# Patient Record
Sex: FEMALE | Race: ASIAN
[De-identification: names, ages, dates, MRNs, and addresses within clinical notes are randomized per-mention and may not be internally consistent; named-entity substitution may affect disease eponyms.]

---

## 2020-02-26 ENCOUNTER — HOSPITAL ENCOUNTER (EMERGENCY)
Dept: HOSPITAL 72 - EMR | Age: 80
Discharge: HOME | End: 2020-02-26
Payer: MEDICARE

## 2020-02-26 VITALS — SYSTOLIC BLOOD PRESSURE: 124 MMHG | DIASTOLIC BLOOD PRESSURE: 72 MMHG

## 2020-02-26 VITALS — HEIGHT: 63 IN | BODY MASS INDEX: 26.58 KG/M2 | WEIGHT: 150 LBS

## 2020-02-26 VITALS — DIASTOLIC BLOOD PRESSURE: 71 MMHG | SYSTOLIC BLOOD PRESSURE: 120 MMHG

## 2020-02-26 VITALS — SYSTOLIC BLOOD PRESSURE: 120 MMHG | DIASTOLIC BLOOD PRESSURE: 71 MMHG

## 2020-02-26 DIAGNOSIS — J98.8: Primary | ICD-10-CM

## 2020-02-26 LAB
ADD MANUAL DIFF: NO
ALBUMIN SERPL-MCNC: 3.8 G/DL (ref 3.4–5)
ALBUMIN/GLOB SERPL: 1.1 {RATIO} (ref 1–2.7)
ALP SERPL-CCNC: 58 U/L (ref 46–116)
ALT SERPL-CCNC: 30 U/L (ref 12–78)
ANION GAP SERPL CALC-SCNC: 11 MMOL/L (ref 5–15)
AST SERPL-CCNC: 24 U/L (ref 15–37)
BASOPHILS NFR BLD AUTO: 1.9 % (ref 0–2)
BILIRUB SERPL-MCNC: 0.8 MG/DL (ref 0.2–1)
BUN SERPL-MCNC: 23 MG/DL (ref 7–18)
CALCIUM SERPL-MCNC: 8.8 MG/DL (ref 8.5–10.1)
CHLORIDE SERPL-SCNC: 110 MMOL/L (ref 98–107)
CO2 SERPL-SCNC: 26 MMOL/L (ref 21–32)
CREAT SERPL-MCNC: 0.8 MG/DL (ref 0.55–1.3)
EOSINOPHIL NFR BLD AUTO: 2.6 % (ref 0–3)
ERYTHROCYTE [DISTWIDTH] IN BLOOD BY AUTOMATED COUNT: 9.5 % (ref 11.6–14.8)
GLOBULIN SER-MCNC: 3.5 G/DL
HCT VFR BLD CALC: 36.1 % (ref 37–47)
HGB BLD-MCNC: 13.5 G/DL (ref 12–16)
LYMPHOCYTES NFR BLD AUTO: 38.1 % (ref 20–45)
MCV RBC AUTO: 90 FL (ref 80–99)
MONOCYTES NFR BLD AUTO: 9.5 % (ref 1–10)
NEUTROPHILS NFR BLD AUTO: 47.9 % (ref 45–75)
PLATELET # BLD: 229 K/UL (ref 150–450)
POTASSIUM SERPL-SCNC: 4.3 MMOL/L (ref 3.5–5.1)
RBC # BLD AUTO: 4 M/UL (ref 4.2–5.4)
SODIUM SERPL-SCNC: 147 MMOL/L (ref 136–145)
WBC # BLD AUTO: 5.6 K/UL (ref 4.8–10.8)

## 2020-02-26 PROCEDURE — 71045 X-RAY EXAM CHEST 1 VIEW: CPT

## 2020-02-26 PROCEDURE — 80053 COMPREHEN METABOLIC PANEL: CPT

## 2020-02-26 PROCEDURE — 85025 COMPLETE CBC W/AUTO DIFF WBC: CPT

## 2020-02-26 PROCEDURE — 36415 COLL VENOUS BLD VENIPUNCTURE: CPT

## 2020-02-26 PROCEDURE — 86710 INFLUENZA VIRUS ANTIBODY: CPT

## 2020-02-26 PROCEDURE — 99283 EMERGENCY DEPT VISIT LOW MDM: CPT

## 2020-02-26 NOTE — NUR
ED Nurse Note:

received a call from [facility], Per Ibeth Reynolds, she is clearing pt. from any 
suspiscion of corona virus

## 2020-02-26 NOTE — EMERGENCY ROOM REPORT
History of Present Illness


General


Chief Complaint:  Upper Respiratory Illness


Source:  Patient





Present Illness


HPI


Disclaimer: Please note that this report is being documented using DRAGON 

technology. This can lead to erroneous entry secondary to incorrect 

interpretation by the dictating instrument.





HPI: 79-year-old female presents for evaluation of cough.  Symptoms began 

yesterday.  Son notes a productive cough over the evening with 2 episodes of 

nonbloody nonbilious emesis.  Subjective fevers noted but no objective 

temperature readings.  Reports chills.  Denies chest pain, abdominal pain, 

nausea at this time.  Denies diarrhea or rash.  No recent travel.  Arrives 

afebrile.  Patient arrives by ambulance.  Family brought her in because they 

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 

worried about exposure to the new coronavirus.  The patient has had no travel.  

No known contact with anyone traveling from [place].


 


PMH: None reported


 


PSH: None reported


 


Allergies: None reported


 


Social Hx: None reported


Allergies:  


Coded Allergies:  


     No Known Allergies (Unverified , 2/26/20)





Nursing Documentation-PMH


Past Medical History:  No History, Except For





Review of Systems


All Other Systems:  negative except mentioned in HPI





Physical Exam





Vital Signs








  Date Time  Temp Pulse Resp B/P (MAP) Pulse Ox O2 Delivery O2 Flow Rate FiO2


 


2/26/20 07:52 98.4 60 15 124/72 (89) 99 Room Air  





 





General: Awake and alert, no acute distress


HEENT: NC/AT. EOMI. 


Cardiovascular: RRR.  S1 and S2 normal.  No murmur appreciated


Resp: Normal work of breathing. No cough, wheezing or crackles appreciated


Abdomen: Abdomen is soft, nondistended.  Nontender


Skin: Intact.  No abrasions, laceration or rash over the exposed skin


MSK: Normal tone and bulk. Moving all extremities.  No obvious deformity.


Neuro: Awake and alert.  Mentating appropriately.





Medical Decision Making


Diagnostic Impression:  


 Primary Impression:  


 Respiratory infection


ER Course


79-year-old female presents for evaluation of cough and emesis.  Describing 

body wide aches and pains as well.  Overall appears to be a viral syndrome, 

possibly influenza though pneumonia, bronchitis, gastritis, gastroenteritis 

also on the differential.  Family is concerned over possible exposure to novel 

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 

that may have been traveling from [place].  The patient herself has no travel 

history and arrives afebrile.





Laboratory Tests








Test


  2/26/20


08:09


 


White Blood Count


  5.6 K/UL


(4.8-10.8)


 


Red Blood Count


  4.00 M/UL


(4.20-5.40)  L


 


Hemoglobin


  13.5 G/DL


(12.0-16.0)


 


Hematocrit


  36.1 %


(37.0-47.0)  L


 


Mean Corpuscular Volume 90 FL (80-99)  


 


Mean Corpuscular Hemoglobin


  33.6 PG


(27.0-31.0)  H


 


Mean Corpuscular Hemoglobin


Concent 37.3 G/DL


(32.0-36.0)  H


 


Red Cell Distribution Width


  9.5 %


(11.6-14.8)  L


 


Platelet Count


  229 K/UL


(150-450)


 


Mean Platelet Volume


  6.2 FL


(6.5-10.1)  L


 


Neutrophils (%) (Auto)


  47.9 %


(45.0-75.0)


 


Lymphocytes (%) (Auto)


  38.1 %


(20.0-45.0)


 


Monocytes (%) (Auto)


  9.5 %


(1.0-10.0)


 


Eosinophils (%) (Auto)


  2.6 %


(0.0-3.0)


 


Basophils (%) (Auto)


  1.9 %


(0.0-2.0)


 


Sodium Level


  147 MMOL/L


(136-145)  H


 


Potassium Level


  4.3 MMOL/L


(3.5-5.1)


 


Chloride Level


  110 MMOL/L


()  H


 


Carbon Dioxide Level


  26 MMOL/L


(21-32)


 


Anion Gap


  11 mmol/L


(5-15)


 


Blood Urea Nitrogen


  23 mg/dL


(7-18)  H


 


Creatinine


  0.8 MG/DL


(0.55-1.30)


 


Estimate Glomerular


Filtration Rate > 60 mL/min


(>60)


 


Glucose Level


  100 MG/DL


()


 


Calcium Level


  8.8 MG/DL


(8.5-10.1)


 


Total Bilirubin


  0.8 MG/DL


(0.2-1.0)


 


Aspartate Amino Transferase


(AST) 24 U/L (15-37)


 


 


Alanine Aminotransferase (ALT)


  30 U/L (12-78)


 


 


Alkaline Phosphatase


  58 U/L


()


 


Total Protein


  7.3 G/DL


(6.4-8.2)


 


Albumin


  3.8 G/DL


(3.4-5.0)


 


Globulin 3.5 g/dL  


 


Albumin/Globulin Ratio 1.1 (1.0-2.7)  








Microbiology








 Date/Time


Source Procedure


Growth Status


 


 


 2/26/20 08:09


Nasal Nares - Final Complete


 


 2/26/20 08:09


Nasal Nares - Final Complete








Chest X-Ray Diagnostic Results


Chest X-Ray Diagnostic Results :  


   Chest X-Ray Ordered:  Yes


   # of Views/Limited/Complete:  1 View


   Indication:  Other - cough


   EP Interpretation:  Yes


   Interpretation:  no consolidation, no effusion, no pneumothorax


   Impression:  No acute disease


   Electronically Signed by:  Electronically signed by Dr. Emmanuel Farmer


Reevaluation Time:  09:16





Last Vital Signs








  Date Time  Temp Pulse Resp B/P (MAP) Pulse Ox O2 Delivery O2 Flow Rate FiO2


 


2/26/20 07:57  60 15   Room Air  


 


2/26/20 07:52 98.4   124/72 (89) 99   








Reevaluation Impression


Labs and chest x-ray are unremarkable.  Flu swabs are negative.  Discussed with 

Ibeth Reynolds at the [facility] regarding the patient's coronavirus risk.  They are not 

recommending hospitalization or further testing at this time.  Patient is 

afebrile, chest x-ray unremarkable and no definite contact noted with any 

travelers from [place].  Offered the patient's Tamiflu however they declined.  

Will continue symptomatic care and PMD follow-up.  Discussed reasons to return 

to the emergency department develop proper hand hygiene.  Patient understands 

and agrees with the treatment plan.


Disposition:  HOME, SELF-CARE


Condition:  Stable


Scripts


Guaifenesin/Dextromethorphan* (Guaifenesin Dm Syrup*) 5 Ml Syrup


10 ML ORAL Q8H PRN for For Cough, #118 ML


   Prov: Emmanuel Farmer MD         2/26/20











Emmanuel Farmer MD Feb 26, 2020 08:09

## 2020-02-26 NOTE — NUR
ED Nurse Note:

patient brought into ED from home by ambulance RA 26 due to coughing since last 
night. patient denies any recent traveling outside of [place]. patient denies any 
fever or chills. afebrile at the time of triage. patient denies SOB/dyspnea. 
patient is alert awake x4 ambulatory, breathing unlabored and even, speaking in 
full sentences. patient provided with a mask.

## 2020-02-26 NOTE — NUR
ER DISCHARGE NOTE:

Patient is cleared to be discharged per ERMD DR CALVERT, pt is aox4, on room 
air, with stable vital signs. pt was given dc and prescription instructions, pt 
was able to verbalize understanding, pt id band and iv site removed without 
complications. patient/son explained that patient is cleared by [facility] for  at 
this time. patient/son verbalized understanding.  pt is able to ambulate with 
steady gait. pt took all belongings.

## 2020-02-26 NOTE — NUR
ED Nurse Note:



son reports that patient has been feeling weak and ill since yesterday, son 
reports patient has a little bit of diarrhea and vomiting as well. son reports 
patient had fever, but was not able to get an accurate temperature. son reports 
that patient lives in an apartment that comes in contact with many people 
traveling outside of [place], son is concerned that patient may be exposed to virus. 
Dr Farmer at bedside examining patient, per Dr. Farmer, patient does not need 
to be isolated at this time.

## 2022-06-23 NOTE — NUR
ED Nurse Note:



pt's son requested to talk to the ERMD again. ERMD made aware. x-ray at 
bedside. Quality 130: Documentation Of Current Medications In The Medical Record: Current Medications Documented Detail Level: Detailed Quality 226: Preventive Care And Screening: Tobacco Use: Screening And Cessation Intervention: Patient screened for tobacco use and is an ex/non-smoker Quality 110: Preventive Care And Screening: Influenza Immunization: Influenza Immunization previously received during influenza season Quality 431: Preventive Care And Screening: Unhealthy Alcohol Use - Screening: Patient not identified as an unhealthy alcohol user when screened for unhealthy alcohol use using a systematic screening method